# Patient Record
Sex: FEMALE | Race: WHITE | NOT HISPANIC OR LATINO | Employment: UNEMPLOYED | URBAN - METROPOLITAN AREA
[De-identification: names, ages, dates, MRNs, and addresses within clinical notes are randomized per-mention and may not be internally consistent; named-entity substitution may affect disease eponyms.]

---

## 2024-07-13 PROBLEM — G37.9 DEMYELINATING DISEASE OF CENTRAL NERVOUS SYSTEM, UNSPECIFIED (HCC): Status: ACTIVE | Noted: 2024-07-13

## 2024-10-24 ENCOUNTER — OFFICE VISIT (OUTPATIENT)
Dept: NEUROLOGY | Facility: MEDICAL CENTER | Age: 64
End: 2024-10-24
Attending: STUDENT IN AN ORGANIZED HEALTH CARE EDUCATION/TRAINING PROGRAM
Payer: COMMERCIAL

## 2024-10-24 VITALS
BODY MASS INDEX: 38.61 KG/M2 | HEIGHT: 67 IN | RESPIRATION RATE: 18 BRPM | TEMPERATURE: 95.7 F | DIASTOLIC BLOOD PRESSURE: 60 MMHG | WEIGHT: 246 LBS | OXYGEN SATURATION: 93 % | HEART RATE: 75 BPM | SYSTOLIC BLOOD PRESSURE: 98 MMHG

## 2024-10-24 DIAGNOSIS — G37.3 TRANSVERSE MYELOPATHY (HCC): ICD-10-CM

## 2024-10-24 PROCEDURE — 99204 OFFICE O/P NEW MOD 45 MIN: CPT | Performed by: STUDENT IN AN ORGANIZED HEALTH CARE EDUCATION/TRAINING PROGRAM

## 2024-10-24 PROCEDURE — 99202 OFFICE O/P NEW SF 15 MIN: CPT | Performed by: STUDENT IN AN ORGANIZED HEALTH CARE EDUCATION/TRAINING PROGRAM

## 2024-10-24 PROCEDURE — 3074F SYST BP LT 130 MM HG: CPT | Performed by: STUDENT IN AN ORGANIZED HEALTH CARE EDUCATION/TRAINING PROGRAM

## 2024-10-24 PROCEDURE — 3078F DIAST BP <80 MM HG: CPT | Performed by: STUDENT IN AN ORGANIZED HEALTH CARE EDUCATION/TRAINING PROGRAM

## 2024-10-24 RX ORDER — CHOLECALCIFEROL (VITAMIN D3) 25 MCG
1000 TABLET ORAL DAILY
COMMUNITY
Start: 2024-08-19

## 2024-10-24 ASSESSMENT — PATIENT HEALTH QUESTIONNAIRE - PHQ9: CLINICAL INTERPRETATION OF PHQ2 SCORE: 0

## 2025-01-04 ENCOUNTER — HOSPITAL ENCOUNTER (OUTPATIENT)
Dept: RADIOLOGY | Facility: MEDICAL CENTER | Age: 65
End: 2025-01-04
Attending: STUDENT IN AN ORGANIZED HEALTH CARE EDUCATION/TRAINING PROGRAM
Payer: COMMERCIAL

## 2025-01-04 ENCOUNTER — HOSPITAL ENCOUNTER (OUTPATIENT)
Dept: LAB | Facility: MEDICAL CENTER | Age: 65
End: 2025-01-04
Attending: STUDENT IN AN ORGANIZED HEALTH CARE EDUCATION/TRAINING PROGRAM
Payer: COMMERCIAL

## 2025-01-04 DIAGNOSIS — G37.3 TRANSVERSE MYELOPATHY (HCC): ICD-10-CM

## 2025-01-04 LAB
HIV 1+2 AB+HIV1 P24 AG SERPL QL IA: NORMAL
T PALLIDUM AB SER QL IA: NORMAL
VIT B12 SERPL-MCNC: 440 PG/ML (ref 211–911)

## 2025-01-04 PROCEDURE — 82607 VITAMIN B-12: CPT

## 2025-01-04 PROCEDURE — 86038 ANTINUCLEAR ANTIBODIES: CPT

## 2025-01-04 PROCEDURE — 700117 HCHG RX CONTRAST REV CODE 255: Mod: JZ | Performed by: STUDENT IN AN ORGANIZED HEALTH CARE EDUCATION/TRAINING PROGRAM

## 2025-01-04 PROCEDURE — 86790 VIRUS ANTIBODY NOS: CPT

## 2025-01-04 PROCEDURE — 86780 TREPONEMA PALLIDUM: CPT

## 2025-01-04 PROCEDURE — 72156 MRI NECK SPINE W/O & W/DYE: CPT

## 2025-01-04 PROCEDURE — 86618 LYME DISEASE ANTIBODY: CPT

## 2025-01-04 PROCEDURE — 86052 AQUAPORIN-4 ANTB CBA EACH: CPT

## 2025-01-04 PROCEDURE — 86362 MOG-IGG1 ANTB CBA EACH: CPT

## 2025-01-04 PROCEDURE — 87389 HIV-1 AG W/HIV-1&-2 AB AG IA: CPT

## 2025-01-04 PROCEDURE — 36415 COLL VENOUS BLD VENIPUNCTURE: CPT

## 2025-01-04 PROCEDURE — 82525 ASSAY OF COPPER: CPT

## 2025-01-04 PROCEDURE — A9579 GAD-BASE MR CONTRAST NOS,1ML: HCPCS | Mod: JZ | Performed by: STUDENT IN AN ORGANIZED HEALTH CARE EDUCATION/TRAINING PROGRAM

## 2025-01-04 RX ADMIN — GADOTERIDOL 20 ML: 279.3 INJECTION, SOLUTION INTRAVENOUS at 14:15

## 2025-01-06 LAB
B BURGDOR.VLSE1+PEPC10 AB SER IA-ACNC: 0.23 IV
COPPER SERPL-MCNC: 120.6 UG/DL (ref 80–155)
NUCLEAR IGG SER QL IA: NORMAL

## 2025-01-07 LAB
AQP4 H2O CHANNEL IGG SERPL QL IF: NORMAL
HTLV I+II AB PATRN SER IB-IMP: NEGATIVE
MOG AB SER QL CBA IFA: NORMAL

## 2025-01-10 ENCOUNTER — TELEPHONE (OUTPATIENT)
Dept: NEUROLOGY | Facility: MEDICAL CENTER | Age: 65
End: 2025-01-10
Payer: COMMERCIAL

## 2025-01-10 NOTE — TELEPHONE ENCOUNTER
NEUROLOGY PATIENT PRE-VISIT PLANNING     Patient was NOT contacted to complete PVP.  Note: Patient will not be contacted if there is no indication to call.     Patient Appointment is scheduled as: Established Patient     Is visit type and length scheduled correctly? Yes    [facility] in Patient Demographics? Yes    3.   Is referral attached to visit? Yes    4. Were records received from referring provider? Yes    4. Patient was NOT contacted to have someone accompany them to visit.     5. Is this appointment scheduled as a Hospital Follow-Up?  No    6. Does the patient require any pre procedure or post procedure follow up? No    7. If any orders were placed at last visit or intended to be done for this visit do we have Results/Consult Notes? Yes  Labs - Labs ordered, completed on 1/4/25 and results are in chart.  Imaging - Imaging ordered, completed and results are in chart.  Referrals - No referrals were ordered at last office visit.  Note: If patient appointment is for lab or imaging review and patient did not complete the studies, check with provider if OK to reschedule patient until completed.    8. If patient appointment is for Botox - is order pended for provider? N/A    9. Was Plan Assessment from last Neurology Office Visit Reviewed?  Yes

## 2025-01-15 ENCOUNTER — OFFICE VISIT (OUTPATIENT)
Dept: NEUROLOGY | Facility: MEDICAL CENTER | Age: 65
End: 2025-01-15
Attending: PSYCHIATRY & NEUROLOGY
Payer: COMMERCIAL

## 2025-01-15 VITALS
HEIGHT: 67 IN | BODY MASS INDEX: 38.53 KG/M2 | RESPIRATION RATE: 15 BRPM | OXYGEN SATURATION: 95 % | DIASTOLIC BLOOD PRESSURE: 80 MMHG | TEMPERATURE: 96.6 F | HEART RATE: 73 BPM | SYSTOLIC BLOOD PRESSURE: 122 MMHG

## 2025-01-15 DIAGNOSIS — G37.3 TRANSVERSE MYELOPATHY (HCC): Primary | ICD-10-CM

## 2025-01-15 PROCEDURE — 99215 OFFICE O/P EST HI 40 MIN: CPT | Performed by: PSYCHIATRY & NEUROLOGY

## 2025-01-15 PROCEDURE — 3074F SYST BP LT 130 MM HG: CPT | Performed by: PSYCHIATRY & NEUROLOGY

## 2025-01-15 PROCEDURE — 99211 OFF/OP EST MAY X REQ PHY/QHP: CPT | Performed by: PSYCHIATRY & NEUROLOGY

## 2025-01-15 PROCEDURE — 3079F DIAST BP 80-89 MM HG: CPT | Performed by: PSYCHIATRY & NEUROLOGY

## 2025-01-15 PROCEDURE — 99417 PROLNG OP E/M EACH 15 MIN: CPT | Performed by: PSYCHIATRY & NEUROLOGY

## 2025-01-15 RX ORDER — DULOXETIN HYDROCHLORIDE 20 MG/1
40 CAPSULE, DELAYED RELEASE ORAL DAILY
Qty: 60 CAPSULE | Refills: 5 | Status: SHIPPED | OUTPATIENT
Start: 2025-01-15 | End: 2025-07-14

## 2025-01-15 ASSESSMENT — PATIENT HEALTH QUESTIONNAIRE - PHQ9
SUM OF ALL RESPONSES TO PHQ QUESTIONS 1-9: 9
CLINICAL INTERPRETATION OF PHQ2 SCORE: 1
5. POOR APPETITE OR OVEREATING: 2 - MORE THAN HALF THE DAYS

## 2025-01-15 NOTE — PROGRESS NOTES
"[facility] NEUROLOGY  MULTIPLE SCLEROSIS & NEUROIMMUNOLOGY  NEW PATIENT VISIT    Referral source: Cecily Newton MD    DISEASE SUMMARY:  Principal neurologic diagnosis: abnormal MRI brain and cervical spine  Diagnosis of MS: n/a  Disease History:  - 2002: onset of sensory symptoms involving the right lower extremity, workup included MRI cervical spine which reportedly showed lesions at C6-7, her symptoms resolved  - established w/ Dr. Chau, initially diagnosed w/ MS, later this diagnosis was taken away, occasionally treated with IVMP or oral steroids  - 2004: episode of visual symptoms (blurry, no pain with eye movements) involving the right eye, treated with steroids, good improvement  - 2021: sensory symptoms (numbness, tingling) involving the right lower extremity, symptoms persisted  - 2021: months later, onset of sensory symptoms involving the left lower extremity  - 2023: onset of sensory symptoms (numbness, tingling) from the neck down and motor symptoms (weakness) involving the hands, treated w/ IVMP x5 days with recovery of strength but not sensory symptoms, workup included MRI  Disease course at onset: abnormal MRI brain and cervical spine  Current disease course: abnormal MRI derrell and cervical spine  Previous disease therapies:  - none  Current disease therapies:  - none  Symptomatic therapies:  - gabapentin: \"didn't sit well with her\"  CSF (10/24/2024):  - OCBs: negative (0)  Other Testing:  - CNS demyelinating disease reflex panel (1/4/2025): WNL  MRI head:  - 3/12/2024: \"no evidence of acute brain injury...\" (contrast not administered)  MRI cervical spine:  - 1/4/2025: \"no active enhancement...\"  - 5/26/2024: \"T2 hyper-intense lesion within the dorsal aspect... at C3-4... demonstrating patchy contrast enhancement... smaller similar lesion also demonstrating contrast enhancement within the substance of the spinal cord dosal to the C6-7 level has resolved...\"  - 5/5/2004: hard copy films " personally reviewed, notable for short-segment, enhancing lesion at C6  MRI thoracic spine:  -   Immunizations:  - influenza?:   - Pneumonia?:  - SARS-CoV-2?:   Cancer Screens:  - mammogram:   - PAP?:   - skin check:     CC: abnormal MRI spine    HISTORY OF ILLNESS:  Kamala Ulloa is a 64 y.o. woman with a history most notable for abnormal MRI.  Today, she was accompanied by her  (Olayinka), and she provided the following history:    I have summarized pertinent history above.    A review of MS-related symptoms was notable for the following:    Fatigue: yes; stays up until 02:00, sleeps part of the night on the couch, gets up for the day at 08:00  Weakness: yes; weak on the right side  Numbness: yes; from the neck down  Incoordination:   Spasms/Spasticity: no  Vision Impairment: no  Walking/Balance Problems: yes; walks all right, can't balance on one foot  Neuralgia: no  Bowel Symptoms: no  Bladder Symptoms:  no more than usual  Heat Sensitivity: yes; sensory symptoms (numbness) worsen  Depression: no  Cognitive/Memory Problems: no  Sexual Dysfunction:  not assessed  Anxiety: no    MEDICAL AND SURGICAL HISTORY:  Past Medical History:   Diagnosis Date    Asthma      Past Surgical History:   Procedure Laterality Date    OTHER      breast aumentation    OTHER ABDOMINAL SURGERY      c section     MEDICATIONS:  Current Outpatient Medications   Medication Sig    DULoxetine (CYMBALTA) 20 MG Cap DR Particles Take 2 Capsules by mouth every day for 180 days.    vitamin D (CHOLECALCIFEROL) 1000 Unit Tab Take 1,000 Units by mouth every day.    POTASSIUM PO Take  by mouth every day.    Calcium-Cholecalciferol (QC CALCIUM 500MG-D3) 500-5 MG-MCG Tab Take 500 mg by mouth every day.    Montelukast Sodium (SINGULAIR PO) Take  by mouth.     SOCIAL HISTORY:  Social History     Tobacco Use    Smoking status: Never    Smokeless tobacco: Never   Substance Use Topics    Alcohol use: Yes     Comment: very rarely     Social History      Social History Narrative    Not on file     FAMILY HISTORY:  No family history on file.    REVIEW OF SYSTEMS:  A ROS was completed.  Pertinent positives and negatives were included in the HPI, above.  All other systems were reviewed and are negative.    PHYSICAL EXAM:  General/Medical:  - NAD    Neuro:  MENTAL STATUS: awake and alert; no deficits of speech or language; oriented to conversation; affect was appropriate to situation; pleasant, cooperative    CRANIAL NERVES:    II: acuity: NT, fields: NT, pupils: NT, discs: NT    III/IV/VI: versions: grossly intact    V: facial sensation: NT    VII: facial expression: symmetric    VIII: hearing: intact to voice    IX/X: palate: NT    XI: shoulder shrug: NT    XII: tongue: NT    MOTOR:  - bulk: NT  - tone: NT  Upper Extremity Strength (R/L)    NT   Elbow flexion NT   Elbow extension NT   Shoulder abduction NT     Lower Extremity Strength  (R/L)   Hip flexion NT   Knee extension NT   Knee flexion NT   Ankle dorsiflexion NT   Ankle plantarflexion NT     - heel-walk: NT  - toe-walk: NT  - pronator drift: absent  - abnormal movements: none    SENSATION:  - light touch: NT  - vibration (R/L, seconds): NT at the great toes  - pinprick: NT  - proprioception: NT  - Romberg: NT    COORDINATION:  - finger to nose: NT  - finger tapping: NT    REFLEXES:  Reflex Right Left   BR NT NT   Biceps NT NT   Triceps NT NT   Patellae NT NT   Achilles NT NT   Toes NT NT     GAIT:  - NT    QUANTITATIVE SCORES:  Timed 25-foot walk (sec): NT today.  Assistive device: none    REVIEW OF IMAGING STUDIES:  I have summarized available data above.    REVIEW OF LABORATORY STUDIES:  I have summarized pertinent data above.    ASSESSMENT:  Kamala Ulloa is a 64 y.o. woman with abnormal MRI spine.  Mrs. Ulloa provided me with discs containing old images.  I have asked the staff to submit these to radiology for upload.  I reviewed many physical films which she brought with her today.  These  "show enhancing lesions in the cervical spine.  I have also asked my staff to request outside images.  At this ponit the underlying diagnosis remains unclear.  We will reconvene in a few weeks once imaging data are a available.    PLAN:  Abnormal MRI Spine:  - discs sent to radiology so images can be uploaded into PACS  - I have asked the medical assistants to request outside images    Follow-Up:  - Return in about 2 weeks (around 1/29/2025).    Signed: Lavell Orellana M.D.    BILLING DOCUMENTATION:   I spent 60 minutes reviewing the medical record, interviewing and examining the patient, discussing my impression (see \"assessment\" above), and coordinating care.  "

## 2025-01-16 ENCOUNTER — HOSPITAL ENCOUNTER (OUTPATIENT)
Dept: RADIOLOGY | Facility: MEDICAL CENTER | Age: 65
End: 2025-01-16
Attending: FAMILY MEDICINE
Payer: COMMERCIAL

## 2025-01-16 ENCOUNTER — HOSPITAL ENCOUNTER (OUTPATIENT)
Dept: RADIOLOGY | Facility: MEDICAL CENTER | Age: 65
End: 2025-01-16
Payer: COMMERCIAL

## 2025-01-16 ENCOUNTER — HOSPITAL ENCOUNTER (OUTPATIENT)
Dept: RADIOLOGY | Facility: MEDICAL CENTER | Age: 65
End: 2025-01-16
Attending: PHYSICAL MEDICINE & REHABILITATION
Payer: COMMERCIAL

## 2025-01-16 ENCOUNTER — HOSPITAL ENCOUNTER (OUTPATIENT)
Dept: RADIOLOGY | Facility: MEDICAL CENTER | Age: 65
End: 2025-01-16
Attending: STUDENT IN AN ORGANIZED HEALTH CARE EDUCATION/TRAINING PROGRAM
Payer: COMMERCIAL

## 2025-01-16 ENCOUNTER — HOSPITAL ENCOUNTER (OUTPATIENT)
Dept: RADIOLOGY | Facility: MEDICAL CENTER | Age: 65
End: 2025-01-16
Attending: PSYCHIATRY & NEUROLOGY
Payer: COMMERCIAL

## 2025-01-16 ENCOUNTER — HOSPITAL ENCOUNTER (OUTPATIENT)
Dept: RADIOLOGY | Facility: MEDICAL CENTER | Age: 65
End: 2025-01-16
Attending: OBSTETRICS & GYNECOLOGY
Payer: COMMERCIAL

## 2025-01-17 ENCOUNTER — HOSPITAL ENCOUNTER (OUTPATIENT)
Dept: RADIOLOGY | Facility: MEDICAL CENTER | Age: 65
End: 2025-01-17
Payer: COMMERCIAL

## 2025-01-20 ENCOUNTER — TELEPHONE (OUTPATIENT)
Dept: NEUROLOGY | Facility: MEDICAL CENTER | Age: 65
End: 2025-01-20
Payer: COMMERCIAL

## 2025-01-20 DIAGNOSIS — G37.3 TRANSVERSE MYELOPATHY (HCC): ICD-10-CM

## 2025-01-20 RX ORDER — DULOXETIN HYDROCHLORIDE 20 MG/1
40 CAPSULE, DELAYED RELEASE ORAL DAILY
Qty: 60 CAPSULE | Refills: 5 | Status: CANCELLED | OUTPATIENT
Start: 2025-01-20 | End: 2025-07-19

## 2025-01-20 NOTE — TELEPHONE ENCOUNTER
[facility] I have added this as her pharmacy of choice. Can you please send. Thank you.

## 2025-01-31 NOTE — TELEPHONE ENCOUNTER
[facility]. I am unable to call it in they do not accept that. Please  eRx. Thank you

## 2025-02-03 DIAGNOSIS — G37.3 TRANSVERSE MYELOPATHY (HCC): ICD-10-CM

## 2025-02-03 RX ORDER — DULOXETIN HYDROCHLORIDE 20 MG/1
40 CAPSULE, DELAYED RELEASE ORAL DAILY
Qty: 60 CAPSULE | Refills: 5 | Status: SHIPPED | OUTPATIENT
Start: 2025-02-03 | End: 2025-02-05

## 2025-02-04 ENCOUNTER — TELEPHONE (OUTPATIENT)
Dept: NEUROLOGY | Facility: MEDICAL CENTER | Age: 65
End: 2025-02-04
Payer: COMMERCIAL

## 2025-02-04 NOTE — TELEPHONE ENCOUNTER
PT called about get her meds sent in DULoxetine (CYMBALTA) 20 MG Cap DR Particles  she needs the sent asap   2/4/24   10:58

## 2025-02-04 NOTE — TELEPHONE ENCOUNTER
Advised pt that [facility] was [facility]. I will try to call to clarify if they have received it.  Carmen Gallegos, Med Ass't

## 2025-02-05 ENCOUNTER — TELEMEDICINE (OUTPATIENT)
Dept: NEUROLOGY | Facility: MEDICAL CENTER | Age: 65
End: 2025-02-05
Attending: PSYCHIATRY & NEUROLOGY
Payer: COMMERCIAL

## 2025-02-05 VITALS — BODY MASS INDEX: 37.67 KG/M2 | WEIGHT: 240 LBS | HEIGHT: 67 IN

## 2025-02-05 DIAGNOSIS — G37.3 TRANSVERSE MYELOPATHY (HCC): Primary | ICD-10-CM

## 2025-02-05 PROCEDURE — 99215 OFFICE O/P EST HI 40 MIN: CPT | Mod: 95 | Performed by: PSYCHIATRY & NEUROLOGY

## 2025-02-05 RX ORDER — DULOXETIN HYDROCHLORIDE 30 MG/1
CAPSULE, DELAYED RELEASE ORAL
Qty: 53 CAPSULE | Refills: 0 | Status: SHIPPED | OUTPATIENT
Start: 2025-02-05 | End: 2025-03-07

## 2025-02-05 RX ORDER — CYANOCOBALAMIN 1000 UG/ML
1000 INJECTION, SOLUTION INTRAMUSCULAR; SUBCUTANEOUS
COMMUNITY

## 2025-02-05 ASSESSMENT — PATIENT HEALTH QUESTIONNAIRE - PHQ9: CLINICAL INTERPRETATION OF PHQ2 SCORE: 0

## 2025-02-06 NOTE — PROGRESS NOTES
"[facility] NEUROLOGY  MULTIPLE SCLEROSIS & NEUROIMMUNOLOGY  FOLLOW-UP VISIT    This evaluation was conducted via Teams using secure and encrypted videoconferencing technology. The patient was in their home in the [facility].    The patient's identity was confirmed and verbal consent was obtained for this virtual visit.    DISEASE SUMMARY:  Principal neurologic diagnosis: abnormal MRI brain and cervical spine  Diagnosis of MS: n/a  Disease History:  - 2002: onset of sensory symptoms involving the right lower extremity, workup included MRI cervical spine which reportedly showed lesions at C6-7, her symptoms resolved  - established w/ Dr. Chau, initially diagnosed w/ MS, later this diagnosis was taken away, occasionally treated with IVMP or oral steroids  - 2004: episode of visual symptoms (blurry, no pain with eye movements) involving the right eye, treated with steroids, good improvement  - 2021: sensory symptoms (numbness, tingling) involving the right lower extremity, symptoms persisted  - 2021: months later, onset of sensory symptoms involving the left lower extremity  - 2023: onset of sensory symptoms (numbness, tingling) from the neck down and motor symptoms (weakness) involving the hands, treated w/ IVMP x5 days with recovery of strength but not sensory symptoms, workup included MRI  Disease course at onset: abnormal MRI brain and cervical spine  Current disease course: abnormal MRI derrell and cervical spine  Previous disease therapies:  - none  Current disease therapies:  - none  Symptomatic therapies:  - gabapentin: \"didn't sit well with her\"  CSF (10/24/2024):  - OCBs: negative (0)  Other Testing:  - KAMALA reflexive profile (1/4/2025): none detected  - CNS demyelinating disease reflex panel (1/4/2025): WNL  MRI head:  - 3/12/2024: \"no evidence of acute brain injury...\" (contrast not administered)  - 5/20/2008: personally reviewed, no obvious MS-typical lesions  MRI cervical spine:  - 1/4/2025: \"no active " "enhancement...\"  - 5/26/2024: \"T2 hyper-intense lesion within the dorsal aspect... at C3-4... demonstrating patchy contrast enhancement... smaller similar lesion also demonstrating contrast enhancement within the substance of the spinal cord dosal to the C6-7 level has resolved...\"  - 5/5/2004: hard copy films personally reviewed, notable for short-segment, enhancing lesion at C6  MRI thoracic spine:  -   Immunizations:  - influenza?:   - Pneumonia?:  - SARS-CoV-2?:   Cancer Screens:  - mammogram:   - PAP?:   - skin check:     CC: abnormal MRI spine    INTERVAL HISTORY:  Kamala Ulloa is a 64 y.o. woman with abnormal MRI cervical spine.  I last saw her in the [facility] on 1/15/2025.  At that time I recommended we obtain outside images.  Today, we followed up via Teams, and she provided the following interval history:    I have summarized pertinent interval data above.    Ms. Ulloa's condition is unchanged.    MEDICATIONS:  Current Outpatient Medications   Medication Sig    cyanocobalamin (VITAMIN B-12) 1000 MCG/ML Solution Inject 1,000 mcg into the shoulder, thigh, or buttocks every 30 days.    DULoxetine (CYMBALTA) 30 MG Cap DR Particles Take 1 Capsule by mouth every day for 7 days, THEN 2 Capsules every day for 23 days.    vitamin D (CHOLECALCIFEROL) 1000 Unit Tab Take 1,000 Units by mouth every day.    POTASSIUM PO Take  by mouth every day.    Calcium-Cholecalciferol (QC CALCIUM 500MG-D3) 500-5 MG-MCG Tab Take 500 mg by mouth every day.    Montelukast Sodium (SINGULAIR PO) Take  by mouth.     MEDICAL, SOCIAL, AND FAMILY HISTORY:  There is no change in the patient's ROS or medical, social, or family histories since the previous visit on 1/15/2025.    REVIEW OF SYSTEMS:  A ROS was completed.  Pertinent positives and negatives were included in the HPI, above.  All other systems were reviewed and are negative.    PHYSICAL EXAM:  General/Medical:  - NAD    Neuro:  MENTAL STATUS: awake and alert; no deficits " of speech or language; oriented to conversation; affect was appropriate to situation; pleasant, cooperative    CRANIAL NERVES:    II: acuity: NT, fields: NT, pupils: NT, discs: NT    III/IV/VI: versions: grossly intact    V: facial sensation: NT    VII: facial expression: symmetric    VIII: hearing: intact to voice    IX/X: palate: NT    XI: shoulder shrug: NT    XII: tongue: NT    MOTOR:  - bulk: NT  - tone: NT  Upper Extremity Strength (R/L)    NT   Elbow flexion NT   Elbow extension NT   Shoulder abduction NT     Lower Extremity Strength  (R/L)   Hip flexion NT   Knee extension NT   Knee flexion NT   Ankle dorsiflexion NT   Ankle plantarflexion NT     - heel-walk: NT  - toe-walk: NT  - pronator drift: absent  - abnormal movements: none    SENSATION:  - light touch: NT  - vibration (R/L, seconds): NT at the great toes  - pinprick: NT  - proprioception: NT  - Romberg: NT    COORDINATION:  - finger to nose: NT  - finger tapping: NT    REFLEXES:  Reflex Right Left   BR NT NT   Biceps NT NT   Triceps NT NT   Patellae NT NT   Achilles NT NT   Toes NT NT     GAIT:  - NT    REVIEW OF IMAGING STUDIES:  I have summarized available data above.    REVIEW OF LABORATORY STUDIES:  I have summarized pertinent data above.    ASSESSMENT:  Kamala Ulloa is a 64 y.o. woman with abnormal MRI cervical spine.  I personally reviewed outside images obtained since the last visit.  At this point the differential diagnosis includes MS (seems unlikely given absence of brain lesions and absence of oligoclonal bands), NMOSD (unlikely given negative antibody tests performed using the gold-standard, cell-based assay), sarcoidosis (unlikely given absence of systemic findings and absence of leptomeningeal involvement), autoimmune myelopathy/spinal cord vasculitis (I have ordered some additional blood work to assess for this including the autoimmune myelopathy panel offered through the [facility]), SDAVF (I have placed a referral  "to interventional radiology requesting a spinal angiogram), and post-infectious (seems unlikely given the chronic time course spanning decades).    PLAN:  Abnormal MRI Cervical Spine:  Orders Placed This Encounter    SJOGREN'S AB, ANTI-SS-A/-SS-B    Anti-Neutrophil Cytoplasmic Antibodies Screen    Paraneoplastic Autoantibody Eval (Ser)    MISCELLANEOUS LAB TEST ([facility]/Other)    Referral to Interventional Radiology    cyanocobalamin (VITAMIN B-12) 1000 MCG/ML Solution    DULoxetine (CYMBALTA) 30 MG Cap DR Particles     Follow-Up:  - Return in about 6 weeks (around 3/19/2025).    Signed: Lavell Orellana M.D.    BILLING DOCUMENTATION:   I spent 47 minutes reviewing the medical record, interviewing and examining the patient, discussing my impression (see \"assessment\" above), and coordinating care.  "

## 2025-02-11 ENCOUNTER — HOSPITAL ENCOUNTER (OUTPATIENT)
Facility: MEDICAL CENTER | Age: 65
End: 2025-02-11
Attending: PSYCHIATRY & NEUROLOGY
Payer: COMMERCIAL

## 2025-02-11 DIAGNOSIS — G37.3 TRANSVERSE MYELOPATHY (HCC): ICD-10-CM

## 2025-02-11 PROCEDURE — 86235 NUCLEAR ANTIGEN ANTIBODY: CPT | Mod: 91

## 2025-02-11 PROCEDURE — 36415 COLL VENOUS BLD VENIPUNCTURE: CPT

## 2025-02-11 PROCEDURE — 83516 IMMUNOASSAY NONANTIBODY: CPT | Mod: 91

## 2025-02-13 LAB
ENA SS-A 60KD AB SER-ACNC: 0 AU/ML (ref 0–40)
ENA SS-A IGG SER QL: 1 AU/ML (ref 0–40)
ENA SS-B IGG SER IA-ACNC: 0 AU/ML (ref 0–40)
MYELOPEROXIDASE AB SER-ACNC: 0 AU/ML (ref 0–19)
PROTEINASE3 AB SER-ACNC: 0 AU/ML (ref 0–19)

## 2025-02-26 LAB — TEST NAME 95000: NORMAL
